# Patient Record
Sex: FEMALE | Race: BLACK OR AFRICAN AMERICAN | NOT HISPANIC OR LATINO
[De-identification: names, ages, dates, MRNs, and addresses within clinical notes are randomized per-mention and may not be internally consistent; named-entity substitution may affect disease eponyms.]

---
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
## 2019-07-02 ENCOUNTER — APPOINTMENT (OUTPATIENT)
Dept: PEDIATRICS | Facility: CLINIC | Age: 19
End: 2019-07-02
Payer: COMMERCIAL

## 2019-07-02 VITALS
DIASTOLIC BLOOD PRESSURE: 62 MMHG | RESPIRATION RATE: 18 BRPM | SYSTOLIC BLOOD PRESSURE: 114 MMHG | BODY MASS INDEX: 24.1 KG/M2 | WEIGHT: 148.2 LBS | HEIGHT: 65.75 IN | TEMPERATURE: 98.8 F | HEART RATE: 72 BPM

## 2019-07-02 PROCEDURE — 90471 IMMUNIZATION ADMIN: CPT

## 2019-07-02 PROCEDURE — 92551 PURE TONE HEARING TEST AIR: CPT

## 2019-07-02 PROCEDURE — 99395 PREV VISIT EST AGE 18-39: CPT | Mod: 25

## 2019-07-02 PROCEDURE — 96127 BRIEF EMOTIONAL/BEHAV ASSMT: CPT | Mod: 59

## 2019-07-02 PROCEDURE — 90632 HEPA VACCINE ADULT IM: CPT

## 2019-07-02 NOTE — HISTORY OF PRESENT ILLNESS
[Up to date] : Up to date [Normal] : normal [Days of Bleeding: _____] : Days of bleeding: [unfilled] [Eats regular meals including adequate fruits and vegetables] : eats regular meals including adequate fruits and vegetables [Grade: ____] : Grade: [unfilled] [Drinks non-sweetened liquids] : drinks non-sweetened liquids  [Calcium source] : calcium source [Has friends] : has friends [At least 1 hour of physical activity a day] : at least 1 hour of physical activity a day [Has interests/participates in community activities/volunteers] : has interests/participates in community activities/volunteers. [Uses safety belts/safety equipment] : uses safety belts/safety equipment  [Displays self-confidence] : displays self-confidence [Has ways to cope with stress] : has ways to cope with stress [Yes] : Patient has had sexual intercourse. [Gets depressed, anxious, or irritable/has mood swings] : gets depressed, anxious, or irritable/has mood swings [Irregular menses] : irregular menses [Heavy Bleeding] : no heavy bleeding [Eats meals with family] : eats meals with family [Sleep Concerns] : no sleep concerns [Normal Performance] : normal performance [Normal Behavior/Attention] : normal behavior/attention [Normal Homework] : normal homework [Has concerns about body or appearance] : does not have concerns about body or appearance [Screen time (except homework) less than 2 hours a day] : no screen time (except homework) less than 2 hours a day [Uses tobacco] : does not use tobacco [Uses electronic nicotine delivery system] : does not use electronic nicotine delivery system [Uses drugs] : does not use drugs  [Drinks alcohol] : does not drink alcohol [Impaired/distracted driving] : no impaired/distracted driving [Has peer relationships free of violence] : has peer relationships free of violence [Has problems with sleep] : does not have problems with sleep [Has thought about hurting self or considered suicide] : has not thought about hurting self or considered suicide [With Teen] : teen [FreeTextEntry8] : BCP [de-identified] : [facility] [de-identified] : social  [de-identified] : discussed various methods for relieving stress , running meditation  ,yoga , jas such as Head space ,  [FreeTextEntry1] : This is a 19 year female here for routine exam . Parents denies any Emergency visits or specialized visits unless listed below\par

## 2019-07-02 NOTE — DISCUSSION/SUMMARY
[FreeTextEntry1] : THis is a adolescent patient here for routine exam .I  have recommended that the patient participates in 60 minutes or more of physical activity a day.  As an older child, I encouraged structured physical activity when possible (ie, participation in team or individual sports, or supervised exercise sessions). I explained that it is important to limit screen time to less than 2 hrs a day. Patients diet was discussed and advice given on how to maintain good healthy caloric intake .\par Anxiety was discussed at length with patient and advice given \par Physical exam is within normal limits . Immunizations were discussed and patient received Hep A #2 \par Patient to follow up in 1 year for routine exam \par \par

## 2019-07-02 NOTE — PHYSICAL EXAM
[Alert] : alert [No Acute Distress] : no acute distress [Normocephalic] : normocephalic [EOMI Bilateral] : EOMI bilateral [Clear tympanic membranes with bony landmarks and light reflex present bilaterally] : clear tympanic membranes with bony landmarks and light reflex present bilaterally  [Pink Nasal Mucosa] : pink nasal mucosa [Nonerythematous Oropharynx] : nonerythematous oropharynx [Supple, full passive range of motion] : supple, full passive range of motion [No Palpable Masses] : no palpable masses [Clear to Ausculatation Bilaterally] : clear to auscultation bilaterally [Regular Rate and Rhythm] : regular rate and rhythm [Normal S1, S2 audible] : normal S1, S2 audible [No Murmurs] : no murmurs [+2 Femoral Pulses] : +2 femoral pulses [Soft] : soft [NonTender] : non tender [Non Distended] : non distended [Normoactive Bowel Sounds] : normoactive bowel sounds [No Hepatomegaly] : no hepatomegaly [No Splenomegaly] : no splenomegaly [No Abnormal Lymph Nodes Palpated] : no abnormal lymph nodes palpated [No Gait Asymmetry] : no gait asymmetry [Normal Muscle Tone] : normal muscle tone [Straight] : straight [No pain or deformities with palpation of bone, muscles, joints] : no pain or deformities with palpation of bone, muscles, joints [+2 Patella DTR] : +2 patella DTR [No Rash or Lesions] : no rash or lesions [Cranial Nerves Grossly Intact] : cranial nerves grossly intact

## 2020-11-22 RX ORDER — AZITHROMYCIN 500 MG/1
500 TABLET, FILM COATED ORAL
Qty: 3 | Refills: 0 | Status: DISCONTINUED | COMMUNITY
Start: 2018-07-28 | End: 2020-11-22

## 2020-11-23 ENCOUNTER — APPOINTMENT (OUTPATIENT)
Dept: PEDIATRICS | Facility: CLINIC | Age: 20
End: 2020-11-23
Payer: COMMERCIAL

## 2020-11-23 VITALS
SYSTOLIC BLOOD PRESSURE: 124 MMHG | HEIGHT: 65.75 IN | WEIGHT: 164.3 LBS | BODY MASS INDEX: 26.72 KG/M2 | TEMPERATURE: 98.7 F | RESPIRATION RATE: 18 BRPM | DIASTOLIC BLOOD PRESSURE: 60 MMHG | HEART RATE: 70 BPM

## 2020-11-23 DIAGNOSIS — Z87.09 PERSONAL HISTORY OF OTHER DISEASES OF THE RESPIRATORY SYSTEM: ICD-10-CM

## 2020-11-23 DIAGNOSIS — Z86.19 PERSONAL HISTORY OF OTHER INFECTIOUS AND PARASITIC DISEASES: ICD-10-CM

## 2020-11-23 DIAGNOSIS — Z00.00 ENCOUNTER FOR GENERAL ADULT MEDICAL EXAMINATION W/OUT ABNORMAL FINDINGS: ICD-10-CM

## 2020-11-23 DIAGNOSIS — Z23 ENCOUNTER FOR IMMUNIZATION: ICD-10-CM

## 2020-11-23 DIAGNOSIS — Z87.898 PERSONAL HISTORY OF OTHER SPECIFIED CONDITIONS: ICD-10-CM

## 2020-11-23 DIAGNOSIS — Z01.84 ENCOUNTER FOR ANTIBODY RESPONSE EXAMINATION: ICD-10-CM

## 2020-11-23 DIAGNOSIS — M25.561 PAIN IN RIGHT KNEE: ICD-10-CM

## 2020-11-23 DIAGNOSIS — F41.9 ANXIETY DISORDER, UNSPECIFIED: ICD-10-CM

## 2020-11-23 PROCEDURE — 99395 PREV VISIT EST AGE 18-39: CPT | Mod: 25

## 2020-11-23 PROCEDURE — 90715 TDAP VACCINE 7 YRS/> IM: CPT

## 2020-11-23 PROCEDURE — 90472 IMMUNIZATION ADMIN EACH ADD: CPT

## 2020-11-23 PROCEDURE — 96160 PT-FOCUSED HLTH RISK ASSMT: CPT | Mod: 59

## 2020-11-23 PROCEDURE — 90746 HEPB VACCINE 3 DOSE ADULT IM: CPT

## 2020-11-23 PROCEDURE — 92551 PURE TONE HEARING TEST AIR: CPT

## 2020-11-23 PROCEDURE — 90471 IMMUNIZATION ADMIN: CPT

## 2020-11-23 PROCEDURE — 99072 ADDL SUPL MATRL&STAF TM PHE: CPT

## 2020-11-23 PROCEDURE — 99173 VISUAL ACUITY SCREEN: CPT | Mod: 59

## 2020-11-23 PROCEDURE — 96127 BRIEF EMOTIONAL/BEHAV ASSMT: CPT

## 2020-11-23 RX ORDER — DAPSONE 75 MG/G
7.5 GEL TOPICAL
Qty: 90 | Refills: 0 | Status: DISCONTINUED | COMMUNITY
Start: 2018-03-16 | End: 2020-11-23

## 2020-11-23 NOTE — DISCUSSION/SUMMARY
[] : The components of the vaccine(s) to be administered today are listed in the plan of care. The disease(s) for which the vaccine(s) are intended to prevent and the risks have been discussed with the caretaker.  The risks are also included in the appropriate vaccination information statements which have been provided to the patient's caregiver.  The caregiver has given consent to vaccinate. [Met privately with the adolescent for part of the office visit?] : Met privately with the adolescent for part of the office visit? Yes [Adolescent demonstrates understanding of his/her conditions and how to take prescribed medications?] : Adolescent demonstrates understanding of his/her conditions and how to take prescribed medications? Yes [Adolescent asks questions during each office  visit and participates in the care plan?] : Adolescent asks questions during each office visit and participates in the care plan? Yes [Discussed using Follow My Health to access health records and communicate with the adolescent's care team?] : Discussed using Follow My Health to access health records and communicate with the adolescent's care team? No [Initiated discussion about transfer to an adult healthcare provider.  Provided copy of transition letter?] : Initiated discussion about transfer to an adult healthcare provider.  Provided copy of transition letter? Yes [Discussed choices for adult care and assist in identifying possible care providers?] : Discussed choices for adult care and assist in identifying possible care providers? Yes [Initiated communication with the adult provider that the family and adolescent has selected?] : Initiated communication with the adult provider that the family and adolescent has selected? No [Transferred health records?] : Transferred health records? No [Discussed nuances of care with the adult provider?] : Discussed nuances of care with the adult provider? No [Followed up after the transfer?] : Followed up after the transfer? No [FreeTextEntry1] : This is an adolescent female who is here today for routine physical and immunizations.\par Patient showed good growth and development from previous visits last year. 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 PASSED PHQ2.\par CARDIO SAW DR GILL 2018 STILL HAS FEELING OF INTERMITENT HEART E=RACING NO CHEST PAIN OR SOB NO DIZZINESS.\par \par Immunizations were discussed . ADACEL GIVEN AND ADULT HEP b SINCE HER BLLOD WORK FROM GYN STATES SHE IS NOT IMMUNE , WILL REPEAT BLOODS IN 6 WEEKS IF STILL NOT IMMUNE TO GET 2ND BOOSTER.\par FLU REFUSED.\par DISCUSSED CRADT SOME ALCOHOL AND MARIJUANA USE, NO VAPING.\par Healthy diet was discussed at length and the importance of exercise was discussed as well. Health and wellness reinforced with patient.  \par Patient to follow up in one year for routine physical and immunizations.\par \par

## 2020-11-23 NOTE — HISTORY OF PRESENT ILLNESS
[Up to date] : Up to date [Normal] : normal [LMP: _____] : LMP: [unfilled] [Cycle Length: _____ days] : Cycle Length: [unfilled] days [Days of Bleeding: _____] : Days of bleeding: [unfilled] [Painful Cramps] : painful cramps [Tampon Use] : tampon use [Eats meals with family] : eats meals with family [Has family members/adults to turn to for help] : has family members/adults to turn to for help [Is permitted and is able to make independent decisions] : Is permitted and is able to make independent decisions [Grade: ____] : Grade: [unfilled] [Normal Performance] : normal performance [Normal Behavior/Attention] : normal behavior/attention [Normal Homework] : normal homework [Has friends] : has friends [At least 1 hour of physical activity a day] : at least 1 hour of physical activity a day [Screen time (except homework) less than 2 hours a day] : screen time (except homework) less than 2 hours a day [Uses safety belts/safety equipment] : uses safety belts/safety equipment  [Yes] : Patient has had sexual intercourse. [Has ways to cope with stress] : has ways to cope with stress [Displays self-confidence] : displays self-confidence [With Teen] : teen [Irregular menses] : no irregular menses [Hirsutism] : no hirsutism [Acne] : no acne [Sleep Concerns] : no sleep concerns [Has interests/participates in community activities/volunteers] : does not have interests/participates in community activities/volunteers [Uses electronic nicotine delivery system] : does not use electronic nicotine delivery system [Exposure to electronic nicotine delivery system] : no exposure to electronic nicotine delivery system [Uses tobacco] : does not use tobacco [Exposure to tobacco] : no exposure to tobacco [Uses drugs] : does not use drugs  [Exposure to drugs] : no exposure to drugs [Drinks alcohol] : does not drink alcohol [Exposure to alcohol] : no exposure to alcohol [Impaired/distracted driving] : no impaired/distracted driving [Has peer relationships free of violence] : does not have peer relationships free of violence [Has problems with sleep] : does not have problems with sleep [Gets depressed, anxious, or irritable/has mood swings] : does not get depressed, anxious, or irritable/has mood swings [Has thought about hurting self or considered suicide] : has not thought about hurting self or considered suicide [FreeTextEntry7] : This patient has been healthy. They have had no ER or specialist visits this past year. 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

## 2020-11-23 NOTE — PHYSICAL EXAM

## 2021-02-24 ENCOUNTER — NON-APPOINTMENT (OUTPATIENT)
Age: 21
End: 2021-02-24

## 2021-05-21 ENCOUNTER — LABORATORY RESULT (OUTPATIENT)
Age: 21
End: 2021-05-21

## 2021-05-22 ENCOUNTER — NON-APPOINTMENT (OUTPATIENT)
Age: 21
End: 2021-05-22

## 2021-05-25 LAB
ALBUMIN SERPL ELPH-MCNC: 4.8 G/DL
ALP BLD-CCNC: 76 U/L
ALT SERPL-CCNC: 13 U/L
ANION GAP SERPL CALC-SCNC: 12 MMOL/L
APPEARANCE: CLEAR
AST SERPL-CCNC: 15 U/L
BASOPHILS # BLD AUTO: 0.02 K/UL
BASOPHILS NFR BLD AUTO: 0.5 %
BILIRUB SERPL-MCNC: 0.6 MG/DL
BILIRUBIN URINE: NEGATIVE
BLOOD URINE: NEGATIVE
BUN SERPL-MCNC: 10 MG/DL
CALCIUM SERPL-MCNC: 9.7 MG/DL
CHLORIDE SERPL-SCNC: 101 MMOL/L
CHOLEST SERPL-MCNC: 167 MG/DL
CO2 SERPL-SCNC: 26 MMOL/L
COLOR: YELLOW
CREAT SERPL-MCNC: 0.88 MG/DL
EOSINOPHIL # BLD AUTO: 0.02 K/UL
EOSINOPHIL NFR BLD AUTO: 0.5 %
ESTIMATED AVERAGE GLUCOSE: 103 MG/DL
GLUCOSE QUALITATIVE U: NEGATIVE
GLUCOSE SERPL-MCNC: 72 MG/DL
HBA1C MFR BLD HPLC: 5.2 %
HBV SURFACE AB SER QL: REACTIVE
HCT VFR BLD CALC: 43.1 %
HDLC SERPL-MCNC: 56 MG/DL
HGB BLD-MCNC: 14.7 G/DL
IMM GRANULOCYTES NFR BLD AUTO: 0.2 %
KETONES URINE: NEGATIVE
LDLC SERPL CALC-MCNC: 97 MG/DL
LEUKOCYTE ESTERASE URINE: NEGATIVE
LYMPHOCYTES # BLD AUTO: 1.6 K/UL
LYMPHOCYTES NFR BLD AUTO: 36.1 %
MAN DIFF?: NORMAL
MCHC RBC-ENTMCNC: 31.9 PG
MCHC RBC-ENTMCNC: 34.1 GM/DL
MCV RBC AUTO: 93.5 FL
MONOCYTES # BLD AUTO: 0.3 K/UL
MONOCYTES NFR BLD AUTO: 6.8 %
NEUTROPHILS # BLD AUTO: 2.48 K/UL
NEUTROPHILS NFR BLD AUTO: 55.9 %
NITRITE URINE: NEGATIVE
NONHDLC SERPL-MCNC: 110 MG/DL
PH URINE: 6
PLATELET # BLD AUTO: 264 K/UL
POTASSIUM SERPL-SCNC: 4.3 MMOL/L
PROT SERPL-MCNC: 7.2 G/DL
PROTEIN URINE: ABNORMAL
RBC # BLD: 4.61 M/UL
RBC # FLD: 11.7 %
SODIUM SERPL-SCNC: 139 MMOL/L
SPECIFIC GRAVITY URINE: 1.03
T3 SERPL-MCNC: 113 NG/DL
T4 SERPL-MCNC: 8 UG/DL
TRIGL SERPL-MCNC: 67 MG/DL
TSH SERPL-ACNC: 0.89 UIU/ML
UROBILINOGEN URINE: NORMAL
WBC # FLD AUTO: 4.43 K/UL

## 2021-05-26 ENCOUNTER — NON-APPOINTMENT (OUTPATIENT)
Age: 21
End: 2021-05-26

## 2024-05-25 ENCOUNTER — EMERGENCY (EMERGENCY)
Facility: HOSPITAL | Age: 24
LOS: 1 days | Discharge: HOME | End: 2024-05-25
Attending: STUDENT IN AN ORGANIZED HEALTH CARE EDUCATION/TRAINING PROGRAM
Payer: COMMERCIAL

## 2024-05-25 VITALS
HEART RATE: 77 BPM | DIASTOLIC BLOOD PRESSURE: 88 MMHG | HEIGHT: 66 IN | OXYGEN SATURATION: 100 % | TEMPERATURE: 98 F | RESPIRATION RATE: 18 BRPM | SYSTOLIC BLOOD PRESSURE: 132 MMHG | WEIGHT: 175.05 LBS

## 2024-05-25 LAB
ALBUMIN SERPL ELPH-MCNC: 4.3 G/DL — SIGNIFICANT CHANGE UP (ref 3.3–5)
ALP SERPL-CCNC: 78 U/L — SIGNIFICANT CHANGE UP (ref 40–120)
ALT FLD-CCNC: 11 U/L — SIGNIFICANT CHANGE UP (ref 10–45)
ANION GAP SERPL CALC-SCNC: 11 MMOL/L — SIGNIFICANT CHANGE UP (ref 5–17)
AST SERPL-CCNC: 13 U/L — SIGNIFICANT CHANGE UP (ref 10–40)
BASOPHILS # BLD AUTO: 0.04 K/UL — SIGNIFICANT CHANGE UP (ref 0–0.2)
BASOPHILS NFR BLD AUTO: 0.6 % — SIGNIFICANT CHANGE UP (ref 0–2)
BILIRUB SERPL-MCNC: 0.3 MG/DL — SIGNIFICANT CHANGE UP (ref 0.2–1.2)
BUN SERPL-MCNC: 11 MG/DL — SIGNIFICANT CHANGE UP (ref 7–23)
CALCIUM SERPL-MCNC: 9.3 MG/DL — SIGNIFICANT CHANGE UP (ref 8.4–10.5)
CHLORIDE SERPL-SCNC: 105 MMOL/L — SIGNIFICANT CHANGE UP (ref 96–108)
CO2 SERPL-SCNC: 23 MMOL/L — SIGNIFICANT CHANGE UP (ref 22–31)
CREAT SERPL-MCNC: 0.87 MG/DL — SIGNIFICANT CHANGE UP (ref 0.5–1.3)
D DIMER BLD IA.RAPID-MCNC: <150 NG/ML DDU — SIGNIFICANT CHANGE UP
EGFR: 95 ML/MIN/1.73M2 — SIGNIFICANT CHANGE UP
EOSINOPHIL # BLD AUTO: 0.11 K/UL — SIGNIFICANT CHANGE UP (ref 0–0.5)
EOSINOPHIL NFR BLD AUTO: 1.8 % — SIGNIFICANT CHANGE UP (ref 0–6)
GLUCOSE SERPL-MCNC: 87 MG/DL — SIGNIFICANT CHANGE UP (ref 70–99)
HCG SERPL-ACNC: <2 MIU/ML — SIGNIFICANT CHANGE UP
HCT VFR BLD CALC: 38.1 % — SIGNIFICANT CHANGE UP (ref 34.5–45)
HGB BLD-MCNC: 13.3 G/DL — SIGNIFICANT CHANGE UP (ref 11.5–15.5)
IMM GRANULOCYTES NFR BLD AUTO: 0.2 % — SIGNIFICANT CHANGE UP (ref 0–0.9)
LYMPHOCYTES # BLD AUTO: 2.46 K/UL — SIGNIFICANT CHANGE UP (ref 1–3.3)
LYMPHOCYTES # BLD AUTO: 39.6 % — SIGNIFICANT CHANGE UP (ref 13–44)
MCHC RBC-ENTMCNC: 32.1 PG — SIGNIFICANT CHANGE UP (ref 27–34)
MCHC RBC-ENTMCNC: 34.9 GM/DL — SIGNIFICANT CHANGE UP (ref 32–36)
MCV RBC AUTO: 92 FL — SIGNIFICANT CHANGE UP (ref 80–100)
MONOCYTES # BLD AUTO: 0.43 K/UL — SIGNIFICANT CHANGE UP (ref 0–0.9)
MONOCYTES NFR BLD AUTO: 6.9 % — SIGNIFICANT CHANGE UP (ref 2–14)
NEUTROPHILS # BLD AUTO: 3.16 K/UL — SIGNIFICANT CHANGE UP (ref 1.8–7.4)
NEUTROPHILS NFR BLD AUTO: 50.9 % — SIGNIFICANT CHANGE UP (ref 43–77)
NRBC # BLD: 0 /100 WBCS — SIGNIFICANT CHANGE UP (ref 0–0)
PLATELET # BLD AUTO: 261 K/UL — SIGNIFICANT CHANGE UP (ref 150–400)
POTASSIUM SERPL-MCNC: 3.8 MMOL/L — SIGNIFICANT CHANGE UP (ref 3.5–5.3)
POTASSIUM SERPL-SCNC: 3.8 MMOL/L — SIGNIFICANT CHANGE UP (ref 3.5–5.3)
PROT SERPL-MCNC: 7.3 G/DL — SIGNIFICANT CHANGE UP (ref 6–8.3)
RBC # BLD: 4.14 M/UL — SIGNIFICANT CHANGE UP (ref 3.8–5.2)
RBC # FLD: 11.9 % — SIGNIFICANT CHANGE UP (ref 10.3–14.5)
SODIUM SERPL-SCNC: 139 MMOL/L — SIGNIFICANT CHANGE UP (ref 135–145)
TROPONIN T, HIGH SENSITIVITY RESULT: <6 NG/L — SIGNIFICANT CHANGE UP (ref 0–51)
WBC # BLD: 6.21 K/UL — SIGNIFICANT CHANGE UP (ref 3.8–10.5)
WBC # FLD AUTO: 6.21 K/UL — SIGNIFICANT CHANGE UP (ref 3.8–10.5)

## 2024-05-25 PROCEDURE — 71046 X-RAY EXAM CHEST 2 VIEWS: CPT

## 2024-05-25 PROCEDURE — 99285 EMERGENCY DEPT VISIT HI MDM: CPT

## 2024-05-25 PROCEDURE — 36000 PLACE NEEDLE IN VEIN: CPT | Mod: XU

## 2024-05-25 PROCEDURE — 93308 TTE F-UP OR LMTD: CPT

## 2024-05-25 PROCEDURE — 80053 COMPREHEN METABOLIC PANEL: CPT

## 2024-05-25 PROCEDURE — 85025 COMPLETE CBC W/AUTO DIFF WBC: CPT

## 2024-05-25 PROCEDURE — 93005 ELECTROCARDIOGRAM TRACING: CPT

## 2024-05-25 PROCEDURE — 71046 X-RAY EXAM CHEST 2 VIEWS: CPT | Mod: 26

## 2024-05-25 PROCEDURE — 93308 TTE F-UP OR LMTD: CPT | Mod: 26

## 2024-05-25 PROCEDURE — 85379 FIBRIN DEGRADATION QUANT: CPT

## 2024-05-25 PROCEDURE — 84484 ASSAY OF TROPONIN QUANT: CPT

## 2024-05-25 PROCEDURE — 99285 EMERGENCY DEPT VISIT HI MDM: CPT | Mod: 25

## 2024-05-25 PROCEDURE — 84702 CHORIONIC GONADOTROPIN TEST: CPT

## 2024-05-25 NOTE — ED PROVIDER NOTE - ATTENDING CONTRIBUTION TO CARE
Anthony Sauceda, DO: I have personally performed a face to face medical and diagnostic evaluation of the patient. I have discussed with and reviewed the Resident's and/or ACP's and/or Medical/PA/NP student's note and agree with the History, ROS, Physical Exam and MDM unless otherwise indicated. A brief summary of my personal evaluation and impression can be found below.     23F no PMHx/PSHx presenting to ED for evaluation of intermittent episodes of sharp L CP that last multiple days at a time with sensation that she cannot take a deep breath. Pt sts pain is worse with movement and worse with touch. Sts she has been to a cardiologist in the past for benign heart murmur ~7 yrs ago. She denies any pleuritic pain, SOB, extremity swelling, lightheadedness, dizziness, palpitations, OCP use. Does endorse car trip from [place] earlier this week. Went to  earlier with unremarkable CXR, told to come her for V/Q scan. She further denies any paresthesias, NVD, abd pain, back pain, neck pain. No other complaints at this time.    CONSTITUTIONAL: well-appearing, in NAD  SKIN: Warm dry, normal skin turgor  HEAD: NCAT  EYES: EOMI, PERRLA, no scleral icterus, conjunctiva pink  ENT: normal pharynx with no erythema or exudates  NECK: Supple; non tender. Full ROM.  CARD: RRR, no murmurs.  RESP: clear to ausculation b/l. No crackles or wheezing.  ABD: soft, non-tender, non-distended, no rebound or guarding.  MSK: Full ROM, no bony tenderness, no pedal edema, no calf tenderness, No parasternal tenderness  PSYCH: Cooperative, appropriate.    Patient is low risk PE however with story will check D-dimer, less likely ACS, will check troponin, patient has a normal EKG, concern for dysrhythmia such as long QT WPW AVRT, hokum, less likely, patient is also had previous echo in the past making structural disease less likely but patient does have family history of cardiomyopathy, if troponin D-dimer and bedside echo unremarkable today likely okay for discharge home.  Less likely infectious with no recent infection no cough no fever, could potentially be pleurisy, less likely pericarditis, patient is age for rheumatological disease however no adjacent symptoms and patient had recent workup from PCP, will likely discharge with PCP follow-up if workup is unremarkable today

## 2024-05-25 NOTE — ED PROVIDER NOTE - PATIENT PORTAL LINK FT
You can access the FollowMyHealth Patient Portal offered by [facility] by registering at the following website: [email or web address]. By joining [facility]’s FollowMyHealth portal, you will also be able to view your health information using other applications (apps) compatible with our system.

## 2024-05-25 NOTE — ED PROVIDER NOTE - PHYSICAL EXAMINATION
General: NAD. Nontoxic, well appearing. Speaking in full sentences.  Eyes: EOMI. Conjunctiva clear  Neck: Supple. FROM  Cardiac: Normal S1 and S2 w/ RRR. No MGR. No JVD appreciated. L chest wall TTP, worse with ROM.  Pulmonary: CTAB. No increased WOB. No wheezes or crackles.  Abdominal: Soft, nontender, no peritoneal signs.  Neurologic: No gross focal sensory or motor deficits. Moves all 4 extremities during encounter.  Musculoskeletal: Strength appropriate in all 4 extremities for age with no limited ROM. No visible deformities or extremity swelling. Radial/DP pulses 2+ B/L. No calf ttp.  Skin: Color appropriate for race. Intact, warm, and well-perfused. No visible lesions or bruising.

## 2024-05-25 NOTE — ED PROVIDER NOTE - CLINICAL SUMMARY MEDICAL DECISION MAKING FREE TEXT BOX
PGY1/Kj, DO: PGY1/Kj, DO: Pt 22 yo F with no PMHx/PSHx presenting to ED for evaluation of intermittent episodes of sharp L CP that last multiple days at a time with sensation that she cannot take a deep breath. Pt sts pain is worse with movement and worse with touch. Sts she has been to a cardiologist in the past for benign heart murmur ~7 yrs ago. She denies any pleuritic pain, SOB, extremity swelling, lightheadedness, dizziness, palpitations, OCP use. Does endorse car trip from [place] earlier this week. Went to  earlier with unremarkable CXR, told to come her for V/Q scan. She further denies any paresthesias, NVD, abd pain, back pain, neck pain. No other complaints at this time. VSS, Spo2 100% on RA. NAD. Nontoxic, well appearing. Speaking in full sentences. CV Normal S1 and S2 w/ RRR. No MGR. No JVD appreciated. L chest wall TTP, worse with ROM. lungs CTAB. No increased WOB. No wheezes or crackles. Extremities with 2+ radial/dp pulses, no extremity swelling, grossly equal in size b/l, no calf ttp. EKG NSR 64 bpm, intervals WNL, no abnml ST elevations, depressions or t-wave inversions. Pain likely consistent with MSK etiology. Plan for basic labs to r/o metabolic/electrolyte derangements, trop (shared decision making), likely will require f/u with cardio and pulmonology. Reeval to dispo.

## 2024-05-25 NOTE — ED PROVIDER NOTE - OBJECTIVE STATEMENT
PGY1/Kj, DO: Pt 24 yo F with no PMHx/PSHx presenting to ED for evaluation of intermittent episodes of sharp L CP that last multiple days at a time with sensation that she cannot take a deep breath. Pt sts pain is worse with movement and worse with touch. Sts she has been to a cardiologist in the past for benign heart murmur ~7 yrs ago. She denies any pleuritic pain, SOB, extremity swelling, OCP use. Went to  earlier with unremarkable CXR, told to come her for V/Q scan. She further denies any paresthesias, NVD, abd pain, back pain, neck pain. No other complaints at this time. PGY1/Kj, DO: Pt 22 yo F with no PMHx/PSHx presenting to ED for evaluation of intermittent episodes of sharp L CP that last multiple days at a time with sensation that she cannot take a deep breath. Pt sts pain is worse with movement and worse with touch. Sts she has been to a cardiologist in the past for benign heart murmur ~7 yrs ago. She denies any pleuritic pain, SOB, extremity swelling, lightheadedness, dizziness, palpitations, OCP use. Does endorse car trip from [place] earlier this week. Went to  earlier with unremarkable CXR, told to come her for V/Q scan. She further denies any paresthesias, NVD, abd pain, back pain, neck pain. No other complaints at this time.

## 2024-05-25 NOTE — ED PROVIDER NOTE - NSICDXFAMILYHX_GEN_ALL_CORE_FT
FAMILY HISTORY:  Father  Still living? Unknown  FH: myocardial infarction, Age at diagnosis: Age Unknown    Uncle  Still living? Unknown  Family history of cardiomyopathy, Age at diagnosis: Age Unknown

## 2024-05-25 NOTE — ED PROVIDER NOTE - PROGRESS NOTE DETAILS
PGY1/Kj, DO: Discussed case with family member via phone who is a nurse.  She is requesting a V/q. scan for rule out DVT and troponin for cardiac disease.  I have explained to her that patient is not expressing any signs and symptoms of DVT, can be ruled out via PERC, family member states that she understands this however would like a D-dimer for screening.  I have explained to her that this is not used to standard screening for clots, and if positive will warrant a CT scan despite very low suspicion and ability to PE RC patient out.  They have expressed that for their peace of mind they would like troponin, D-dimer, basic labs.  Will not repeat CXR as patient had it done at urgent care with results at bedside stating that it is WNL. Will plan for ED bedside echocardiogram.  She will likely need to follow-up with cardiology and pulmonology outpatient.

## 2024-05-26 VITALS
HEART RATE: 66 BPM | SYSTOLIC BLOOD PRESSURE: 128 MMHG | RESPIRATION RATE: 16 BRPM | TEMPERATURE: 98 F | DIASTOLIC BLOOD PRESSURE: 84 MMHG | OXYGEN SATURATION: 99 %

## 2024-05-26 NOTE — ED ADULT NURSE NOTE - NSFALLUNIVINTERV_ED_ALL_ED
Bed/Stretcher in lowest position, wheels locked, appropriate side rails in place/Call bell, personal items and telephone in reach/Instruct patient to call for assistance before getting out of bed/chair/stretcher/Non-slip footwear applied when patient is off stretcher/[place] to call system/Physically safe environment - no spills, clutter or unnecessary equipment/Purposeful proactive rounding/Room/bathroom lighting operational, light cord in reach

## 2024-05-26 NOTE — ED ADULT NURSE NOTE - OBJECTIVE STATEMENT
normal... 24 y/o female no PMHX c/o intermittent chest pain which lasts for several days at a time. Pt states visiting an UC where xray did not reveal any findings. Pt is Axox4. Pt denies pain on exertion. Pt states pain can cause SOB at times. Pt airway is patent, breathing shallow and unlabored. skin is warm dry, color appropriate for ethnicity. Stretcher locked and in lowest position, appropriate side rails up. Pt instructed to notify RN if assistance is needed.

## 2024-05-26 NOTE — ED ADULT NURSE NOTE - SUICIDE SCREENING QUESTION 1
Chief Complaint   Patient presents with   • Routine Prenatal Visit       HPI: [place] is a  currently at 11w4d who today reports the following:  Nausea - No; Vaginal bleeding -  No; Heartburn - No.    ROS:  GI: Constipation - No; Diarrhea - No    Neuro: Headache -YES ' faint occasionally; Visual change - No      EXAM:  Vitals: See prenatal flowsheet   Abdomen: See prenatal flowsheet   Urine glucose/protein: See prenatal flowsheet   Pelvic: See prenatal flowsheet     Prenatal Labs  Lab Results   Component Value Date    HGB 11.2 (L) 2017    RUBELLASCRN Non-Immune 2016    HEPBSAG Negative 2016    ABO A 2016    RH Positive 2016    ABSCRN Negative 2016    GBSANTIGEN Negative 2016       MDM:  Impression: 1. Supervision of pregnancy  2. Twin pregnancy - DC/DA   Tests done today: 1. harmony and  new ob labs   Topics discussed: 1. Continue with PNV's  2. Prenatal labs reviewed  3. see above; follow up scan at [facility]   Tests next visit: U/S for EFW   Next visit: See prenatal flowsheet       
No

## 2025-08-09 ENCOUNTER — EMERGENCY (EMERGENCY)
Facility: HOSPITAL | Age: 25
LOS: 1 days | End: 2025-08-09
Admitting: EMERGENCY MEDICINE
Payer: COMMERCIAL

## 2025-08-09 VITALS
RESPIRATION RATE: 18 BRPM | DIASTOLIC BLOOD PRESSURE: 78 MMHG | SYSTOLIC BLOOD PRESSURE: 124 MMHG | HEART RATE: 90 BPM | TEMPERATURE: 98 F | OXYGEN SATURATION: 98 %

## 2025-08-09 PROBLEM — Z78.9 OTHER SPECIFIED HEALTH STATUS: Chronic | Status: ACTIVE | Noted: 2024-05-25

## 2025-08-09 PROCEDURE — 99283 EMERGENCY DEPT VISIT LOW MDM: CPT
